# Patient Record
Sex: FEMALE
[De-identification: names, ages, dates, MRNs, and addresses within clinical notes are randomized per-mention and may not be internally consistent; named-entity substitution may affect disease eponyms.]

---

## 2023-05-24 ENCOUNTER — APPOINTMENT (OUTPATIENT)
Dept: PHYSICAL MEDICINE AND REHAB | Facility: CLINIC | Age: 58
End: 2023-05-24
Payer: COMMERCIAL

## 2023-05-24 ENCOUNTER — NON-APPOINTMENT (OUTPATIENT)
Age: 58
End: 2023-05-24

## 2023-05-24 VITALS
DIASTOLIC BLOOD PRESSURE: 77 MMHG | SYSTOLIC BLOOD PRESSURE: 103 MMHG | HEIGHT: 64 IN | HEART RATE: 71 BPM | RESPIRATION RATE: 18 BRPM | WEIGHT: 112 LBS | BODY MASS INDEX: 19.12 KG/M2

## 2023-05-24 DIAGNOSIS — Z85.828 PERSONAL HISTORY OF OTHER MALIGNANT NEOPLASM OF SKIN: ICD-10-CM

## 2023-05-24 DIAGNOSIS — Z80.3 FAMILY HISTORY OF MALIGNANT NEOPLASM OF BREAST: ICD-10-CM

## 2023-05-24 DIAGNOSIS — Z00.00 ENCOUNTER FOR GENERAL ADULT MEDICAL EXAMINATION W/OUT ABNORMAL FINDINGS: ICD-10-CM

## 2023-05-24 DIAGNOSIS — I83.813 VARICOSE VEINS OF BILATERAL LOWER EXTREMITIES WITH PAIN: ICD-10-CM

## 2023-05-24 DIAGNOSIS — Z78.9 OTHER SPECIFIED HEALTH STATUS: ICD-10-CM

## 2023-05-24 DIAGNOSIS — R52 PAIN, UNSPECIFIED: ICD-10-CM

## 2023-05-24 DIAGNOSIS — M79.651 PAIN IN RIGHT THIGH: ICD-10-CM

## 2023-05-24 DIAGNOSIS — Z86.39 PERSONAL HISTORY OF OTHER ENDOCRINE, NUTRITIONAL AND METABOLIC DISEASE: ICD-10-CM

## 2023-05-24 DIAGNOSIS — M25.651 STIFFNESS OF RIGHT HIP, NOT ELSEWHERE CLASSIFIED: ICD-10-CM

## 2023-05-24 DIAGNOSIS — Z82.62 FAMILY HISTORY OF OSTEOPOROSIS: ICD-10-CM

## 2023-05-24 PROCEDURE — 99204 OFFICE O/P NEW MOD 45 MIN: CPT

## 2023-05-24 RX ORDER — NAPROXEN 500 MG/1
500 TABLET ORAL
Qty: 60 | Refills: 2 | Status: ACTIVE | COMMUNITY
Start: 2023-05-24 | End: 1900-01-01

## 2023-05-24 NOTE — PHYSICAL EXAM
[FreeTextEntry1] : PHYSICAL EXAM : OBJECTIVE \par \par GENERAL : Awake ,alert and oriented to time place and person \par HEAD : normocephalic and atraumatic \par NECK : supple ,no tracheal deviation ,no thyroid enlargement noted with swallowing\par EYES : sclera and conjunctiva normal no redness,intact extraocular movements \par ENT  : ears and nose normal in appearance -hearing adequate \par PULMONARY: effort normal. No respiratory distress. breathing regular. No wheezes \par LYMPH : No swelling in limbs, capillary return within normal range \par CVS : warm extremities,no palpitations,not short of breath, no visible jugular venous distention\par PSYCH : mood and affect normal ,good eye contact ,normal attention \par ABDOMEN : no visible distension , \par NEUROLOGICAL:cranial nerves intact,muscle tone normal,gait and balance safe except where noted below \par SKIN : warm and dry No rash detected over specific body areas examined \par MUSCULOSKELETAL: normal muscle bulk, no focal bony tenderness /posture normal except where specified below\par R hip poor ROM viry abduction \par MMT 5/5 \par GT ok \par groin mildly tender \par spine full ROM can touch ground\par No long tract signs found on clinical exam and no focal neurological deficits\par \par

## 2023-05-24 NOTE — ASSESSMENT
[FreeTextEntry1] : \par PLAN AND RECOMMENDATIONS :\par \par We discussed differential diagnosis and clinical impression\par suspect HIP OA \par will get imaging advise start PT yoga and nsaids for painful days \par \par Recommend\par .symptomatic care and support\par  medications NSAIDS as needed - naprosyn 500 mg (once or twice a day), -warned of  possible GI side effects -advised to take with meals or add over the counter Nexium, if sensitive\par \par  imaging pelvis and both hips \par  referral to PT stars here 2 x a week \par  hydrotherapy /heat / cold for pain\par  continue  precautions including care with bending, lifting, twisting and  carrying.\par \par  relative rest and avoidance of painful activity where possible \par  increasing activity as discussed \par  return for follow up after imaging \par The patient had the opportunity to ask questions and all were answered to their satisfaction. We will coordinate treatment with the other members of the treatment team.\par The patient verbalized understanding of the management/plan rationale and agreed with my recommendations.\par Total clinician time on the date of this encounter was at least 45 minutes- including\par \par 1 preparing to see the patient and review of prior notes, tests and other records \par 2 obtaining and reviewing and/ or confirming the history\par 3 performing a medically necessary, pertinent  and appropriate examination \par 4 ordering medications and supplements explaining side effects to look for ,tests,procedures,therapy and or specialty referrals\par 5 explaining the diagnosis or differential to the patient \par 6 communicating with other physicians or providers before during or after the visit. note to be sent to Dr SANDERS \par \par \par

## 2023-05-24 NOTE — REVIEW OF SYSTEMS
[Fever] : no fever [Lower Ext Edema] : no lower extremity edema [Joint Pain] : joint pain [Joint Stiffness] : joint stiffness [Muscle Weakness] : no muscle weakness [Difficulty Walking] : difficulty walking [Negative] : Heme/Lymph

## 2023-05-24 NOTE — CONSULT LETTER
[FreeTextEntry1] : Dear Dr. ANDRES SANDERS  , \par \par I had the pleasure of evaluating your patient, NOEMI LOWERY .\par \par Thank you very much for allowing me to participate in the care of this patient. If you have any questions, please do not hesitate to contact me. \par \par Sincerely, \par Win Oliveros MD \par \par ABPMR Board Certified in Physical Medicine and Rehabilitation\par Certified Fellow of AANEM (Neuromuscular and Electrodiagnostic Medicine)\par Subspecialty certified in Sports Medicine (ABPMR)\par \par  of Physical Medicine and Rehabilitation\par [facility] [employer] [facility]\par [facility]\par \par

## 2023-05-24 NOTE — HISTORY OF PRESENT ILLNESS
[FreeTextEntry1] : Ms. NOEMI LOWERY is a very pleasant 58 year female who seen for evaluation of R thigh groin pain   that has been ongoing for 2 months  without any specific injury or inciting event. The pain is located primarily groin and thigh  intermittent in nature and described as sharp  . The pain is rated as 4/10 during today's visit, and ranges from 4-7/10. The patient's symptoms are aggravated by stairs legs feel tired putting on shoes an d socks and  bending  and alleviated by rest . The patient denies any night pain, numbness/tingling, weakness, or bowel/bladder dysfunction. The patient has no other complaints at this time.\par she is a  sits all day \par

## 2023-05-30 ENCOUNTER — RESULT REVIEW (OUTPATIENT)
Age: 58
End: 2023-05-30

## 2023-05-30 ENCOUNTER — APPOINTMENT (OUTPATIENT)
Dept: RADIOLOGY | Facility: CLINIC | Age: 58
End: 2023-05-30
Payer: COMMERCIAL

## 2023-05-30 ENCOUNTER — OUTPATIENT (OUTPATIENT)
Dept: OUTPATIENT SERVICES | Facility: HOSPITAL | Age: 58
LOS: 1 days | End: 2023-05-30

## 2023-05-30 PROCEDURE — 73522 X-RAY EXAM HIPS BI 3-4 VIEWS: CPT | Mod: 26

## 2023-05-31 ENCOUNTER — TRANSCRIPTION ENCOUNTER (OUTPATIENT)
Age: 58
End: 2023-05-31

## 2023-05-31 PROCEDURE — 77085 DXA BONE DENSITY AXL VRT FX: CPT | Mod: 26

## 2023-06-06 ENCOUNTER — APPOINTMENT (OUTPATIENT)
Dept: PHYSICAL MEDICINE AND REHAB | Facility: CLINIC | Age: 58
End: 2023-06-06
Payer: COMMERCIAL

## 2023-06-06 VITALS — HEIGHT: 64 IN | WEIGHT: 112 LBS | BODY MASS INDEX: 19.12 KG/M2

## 2023-06-06 DIAGNOSIS — M25.651 STIFFNESS OF RIGHT HIP, NOT ELSEWHERE CLASSIFIED: ICD-10-CM

## 2023-06-06 DIAGNOSIS — M25.851 OTHER SPECIFIED JOINT DISORDERS, RIGHT HIP: ICD-10-CM

## 2023-06-06 DIAGNOSIS — M25.551 PAIN IN RIGHT HIP: ICD-10-CM

## 2023-06-06 DIAGNOSIS — G89.29 PAIN IN RIGHT HIP: ICD-10-CM

## 2023-06-06 DIAGNOSIS — R10.30 LOWER ABDOMINAL PAIN, UNSPECIFIED: ICD-10-CM

## 2023-06-06 DIAGNOSIS — M24.9 JOINT DERANGEMENT, UNSPECIFIED: ICD-10-CM

## 2023-06-06 DIAGNOSIS — M81.0 AGE-RELATED OSTEOPOROSIS W/OUT CURRENT PATHOLOGICAL FRACTURE: ICD-10-CM

## 2023-06-06 DIAGNOSIS — Z01.419 ENCOUNTER FOR GYNECOLOGICAL EXAMINATION (GENERAL) (ROUTINE) W/OUT ABNORMAL FINDINGS: ICD-10-CM

## 2023-06-06 PROCEDURE — 99215 OFFICE O/P EST HI 40 MIN: CPT | Mod: 95

## 2023-06-06 NOTE — REVIEW OF SYSTEMS
[Patient Intake Form Reviewed] : Patient intake form was reviewed [Negative] : Heme/Lymph [Fever] : no fever [Recent Change In Weight] : ~T no recent weight change [Lower Ext Edema] : no lower extremity edema

## 2023-06-06 NOTE — HISTORY OF PRESENT ILLNESS
[Home] : at home, [unfilled] , at the time of the visit. [Medical Office: ([facility])___] : at the medical office located in  [Verbal consent obtained from patient] : the patient, [unfilled] [FreeTextEntry1] : Ms. NOEMI LOWERY is a very pleasant 58 year female who seen for evaluation of R thigh groin pain   that has been ongoing for 2 months  without any specific injury or inciting event. The pain is located primarily groin and thigh  intermittent in nature and described as sharp  . The pain is rated as 4/10 during today's visit, and ranges from 4-7/10. The patient's symptoms are aggravated by stairs legs feel tired putting on shoes an d socks and  bending  and alleviated by rest . The patient denies any night pain, numbness/tingling, weakness, or bowel/bladder dysfunction. The patient has no other complaints at this time.\par she is a  sits all day \par xrays MR spine WNR \par xrays R hip shows undercover femoral head R>L and impingement from femoral lip \par dexa scan shows T score spine -2.7 \par \par

## 2023-06-06 NOTE — PHYSICAL EXAM
[Normal] : Oriented to person, place, and time, insight and judgement were intact and the affect was normal [de-identified] : fem acet impingement R stiff hip

## 2023-06-06 NOTE — ASSESSMENT
[FreeTextEntry1] : \par PLAN AND RECOMMENDATIONS :\par \par We discussed differential diagnosis and clinical impression\par new issues \par 1 osteoporosis newly diagnosed \par needs gyn and Rx bone building \par discussed HRT but has family history breast cancer grandma and aunt \par to see specialist asap \par 2 fem acetabular impingement R> L\par To help the patient understand their condition a plastic 3D model of the hip was used to better explain.\par advise yoga PT and ortho opinion Dr Suarez \par 3 loose jointed she thinks she has oslers danlos -but wound healing alwys fine -reassured just hypermobile \par 4 new onset tingling burning R leg -but short lasting reassured prob mechanical \par \par \par Recommend\par .symptomatic care and support\par  medications NSAIDS as needed -  OTC fine (-personal preference )-(once or twice a day), -warned of  possible GI side effects -advised to take with meals or add over the counter Nexium, if sensitive\par \par  imaging done \par  referral to gyn and ortho \par  hydrotherapy /heat / cold for pain\par  continue  spinal /hip precautions including care with bending, lifting, twisting and  carrying.\par \par  relative rest and avoidance of painful activity where possible \par  increasing activity as discussed \par  return for follow up after she returns [facility] \[place] \par \par The patient had the opportunity to ask questions and all were answered to their satisfaction. We will coordinate treatment with the other members of the treatment team.\par The patient verbalized understanding of the management/plan rationale and agreed with my recommendations.\par \par time altogether many issues to discuss 45 mins

## 2023-06-06 NOTE — DATA REVIEWED
[Plain X-Rays] : plain X-Rays [MRI] : MRI [FreeTextEntry1] : \par  DEXA Bone Density Axial with Vertebral Fracture Assessment             Final\par \par No Documents Attached\par \par \par \par \par   EXAM: [id number] - XR BONE DENS AXIAL W VERT FX  - ORDERED BY: IAN MERRITT\par \par \par PROCEDURE DATE:  05/31/2023\par \par \par \par INTERPRETATION:  BONE DENSITY STUDY OF THE LUMBAR SPINE AND LEFT HIP\par VERTEBRAL FRACTURE ANALYSIS\par \par Clinical History: 58-year old postmenopausal female. Baseline examination.\par \par Procedure: Measurements of bone density were made in the lumbar spine and in the left hip using dual x-ray absorptiometry (DEXA). Vertebral fracture analysis was also performed on a lateral image of the spine.\par \par Results:\par \par Left Hip (Total)\par BMD       0.775 g/sq. cm.\par T-Score  -1.4 SD from the mean\par Z-Score  -0.6 SD from the mean\par \par Femoral neck\par BMD       0.690 g/sq. cm.\par T-Score  -1.4 SD from the mean\par Z-Score  -0.3 SD from the mean\par \par Spine\par BMD       0.755 g/sq. cm.\par T-Score  -2.7 SD from the mean\par Z-Score  -1.5 SD from the mean\par \par Vertebral fracture analysis shows no compression deformity.\par \par \par Impression:\par \par Osteoporosis.\par \par \par Legend:\par BMD = Bone mineral density\par T-Score = Variance from a young adult population matched for gender and ethnicity\par Z-Score = Variance from a population matched for age, gender and ethnicity\par ([employer] W)\par \par \par \par \par  Xray Hip w/ Pelvis 3-4 Views, Bilateral             Final\par \par No Documents Attached\par \par \par \par \par   EXAM: [id number] - XR HIPS BI WITH PELV 3-4V  - ORDERED BY: IAN MERRITT\par \par \par PROCEDURE DATE:  05/30/2023\par \par \par \par INTERPRETATION:  CLINICAL HISTORY: 58-year-old with pain.\par \par \par \par \par IMPRESSION: Frontal view of the pelvis with hips in neutral and abduction positioning demonstrates relative acetabular undercoverage of the right femoral head and to lesser extent within the left femoral head. Limited by overlying artifact. Osteopenia. No fracture. No dislocation. Osseous protuberance of the bilateral femoral head neck junctions which may contribute to acetabular impingement.\par \par \par \par \par \par \par \par \par \par

## 2023-06-06 NOTE — CONSULT LETTER
[FreeTextEntry1] : Dear Dr. ANDRES SANDERS  , \par \par I had the pleasure of re evaluating your patient, NOEMI LOWERY .\par \par Thank you very much for allowing me to participate in the care of this patient. If you have any questions, please do not hesitate to contact me. \par \par Sincerely, \par Win Oliveros MD \par \par ABPMR Board Certified in Physical Medicine and Rehabilitation\par Certified Fellow of AANEM (Neuromuscular and Electrodiagnostic Medicine)\par Subspecialty certified in Sports Medicine (ABPMR)\par \par  of Physical Medicine and Rehabilitation\par [facility] [employer] [facility]\par [facility]\par \par

## 2024-05-30 ENCOUNTER — APPOINTMENT (OUTPATIENT)
Dept: VASCULAR SURGERY | Facility: CLINIC | Age: 59
End: 2024-05-30